# Patient Record
Sex: FEMALE | Race: OTHER | Employment: OTHER | URBAN - METROPOLITAN AREA
[De-identification: names, ages, dates, MRNs, and addresses within clinical notes are randomized per-mention and may not be internally consistent; named-entity substitution may affect disease eponyms.]

---

## 2018-02-05 NOTE — PATIENT DISCUSSION
Discussed condition and exacerbating conditions/situations (e.g., dry/arid environments, overhead fans, air conditioners, side effect of medications). Yes

## 2018-09-06 NOTE — PATIENT DISCUSSION
IOLs with FD.  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  Will need to come out RGPs for some time before IOLs (usually a week per decade of wear).

## 2019-09-12 NOTE — PATIENT DISCUSSION
pt says 15 years ago had a mild stroke in the eye.  ed lets get VF to assess effects on [place] OS so we can best direct his IOL choice and know what actual documentable effects are present.

## 2019-09-12 NOTE — PATIENT DISCUSSION
Cataract symptoms i.e., glare, blur discussed. Pt to call if worsening vision or trouble with driving, TV, reading, ADL. UV precautions. Reviewed possibility of future cataract surgery. home

## 2019-11-06 NOTE — PATIENT DISCUSSION
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  CAUTION with IOL choice OS per KMS.

## 2020-02-10 NOTE — PATIENT DISCUSSION
Patient understands he will still have a blind spot after cataract surgery. Diamox with surgery. No Lensar OS. No advanced lenses.

## 2020-02-10 NOTE — PATIENT DISCUSSION
Patient understands we will not use the Lensar on the left eye since the suction will increase the eye pressure. Patient understands he will still have a blind spot after cataract surgery.

## 2020-02-10 NOTE — PATIENT DISCUSSION
The patient was informed that with [address] [facility] for distance, they will need glasses for all near and intermediate activities after surgery. The patient understands there is a possibility they may need an enhancement after surgery. The patient elects Custom Vision OD (+/- Toric), goal of emmetropia.

## 2020-02-10 NOTE — PATIENT DISCUSSION
Patient has had contact lenses out for 1 month but measurements still show a disparity. Samples of Systance dispensed today. Patient to use QID and come back for repeat measurments in 1 week.

## 2020-02-17 NOTE — PATIENT DISCUSSION
The patient was informed that with [address] [facility] for distance, they will need glasses for all near and intermediate activities after surgery. The patient understands there is a possibility they may need an enhancement after surgery. The patient elects Custom Vision OD (+/- Toric), goal of emmetropia.

## 2020-02-24 NOTE — PATIENT DISCUSSION
The patient was informed that with [address] [facility] for distance, they will need glasses for all near and intermediate activities after surgery. The patient understands there is a possibility they may need an enhancement after surgery. The patient elects Custom Vision OD (+/- Toric), goal of emmetropia.

## 2020-02-25 NOTE — PATIENT DISCUSSION
ok to proceed with IOL OS due to FD dec for sx made today with KMS and Goal: Custom Vision emmetropia.

## 2020-02-25 NOTE — PATIENT DISCUSSION
The patient was informed that with [address] [facility] for distance, they will need glasses for all near and intermediate activities after surgery. The patient understands there is a possibility they may need an enhancement after surgery. The patient elects Custom Vision OD (+/- Toric), goal of emmetropia.

## 2021-08-24 NOTE — PATIENT DISCUSSION
Sending per provider okay.    recent trauma.  NO EOM limitations today but conj is some congested and injected.  try Durezol TID for 5-7 days and FU with dilation.  IF NI will order plain films.   periorbital bones laterally are  to touch and the eye just feels tender to patient definitely sustained soft tissue injury but ecchymosis is gone now.

## 2021-09-02 NOTE — PATIENT DISCUSSION
recent trauma.  NO EOM limitations today but conj is some congested and injected.  try Durezol TID for 5-7 days and FU with dilation.  IF NI will order plain films.   periorbital bones laterally are  to touch and the eye just feels tender to patient definitely sustained soft tissue injury but ecchymosis is gone now.

## 2021-12-22 NOTE — PATIENT DISCUSSION
F/u due The patient was informed that with [address] [facility] for distance, they will need glasses for all near and intermediate activities after surgery. The patient understands there is a possibility they may need an enhancement after surgery. The patient elects Custom Vision OD (+/- Toric), goal of emmetropia.

## 2022-03-24 ENCOUNTER — FOLLOW UP (OUTPATIENT)
Dept: URBAN - METROPOLITAN AREA CLINIC 28 | Facility: CLINIC | Age: 57
End: 2022-03-24

## 2022-03-24 DIAGNOSIS — H43.812: ICD-10-CM

## 2022-03-24 DIAGNOSIS — H04.123: ICD-10-CM

## 2022-03-24 DIAGNOSIS — H35.372: ICD-10-CM

## 2022-03-24 PROCEDURE — 92134 CPTRZ OPH DX IMG PST SGM RTA: CPT

## 2022-03-24 PROCEDURE — 92012 INTRM OPH EXAM EST PATIENT: CPT

## 2022-03-24 ASSESSMENT — VISUAL ACUITY
OD_CC: 20/20
OS_CC: 20/20

## 2022-03-24 ASSESSMENT — TONOMETRY
OS_IOP_MMHG: 16
OD_IOP_MMHG: 16

## 2022-03-24 NOTE — PATIENT DISCUSSION
Advised no jogs or quick sudden movements - avoid jogging, bumpy boat rides, tennis/pickle ball etc.

## 2022-04-13 NOTE — PATIENT DISCUSSION
Discussed the risks/benefits of laser capsulotomy. Observation recommended. Left message for patient to call back

## 2022-04-15 ENCOUNTER — FOLLOW UP (OUTPATIENT)
Dept: URBAN - METROPOLITAN AREA CLINIC 28 | Facility: CLINIC | Age: 57
End: 2022-04-15

## 2022-04-15 DIAGNOSIS — H43.812: ICD-10-CM

## 2022-04-15 PROCEDURE — 99213 OFFICE O/P EST LOW 20 MIN: CPT

## 2022-04-15 ASSESSMENT — TONOMETRY
OS_IOP_MMHG: 14
OD_IOP_MMHG: 13

## 2022-04-15 ASSESSMENT — VISUAL ACUITY
OD_CC: 20/20
OS_CC: 20/20

## 2022-04-15 NOTE — PATIENT DISCUSSION
F/U immediately if notice any symptoms or RT or RD such as a veil over vision, cobweb appearance, new floaters or notices any flashes of light.

## 2022-04-15 NOTE — PATIENT DISCUSSION
Advised no jogs or quick sudden movements - avoid jogging, bumpy boat rides, tennis/pickle ball etc. for another few weeks.

## 2022-07-30 ENCOUNTER — TELEPHONE ENCOUNTER (OUTPATIENT)
Age: 57
End: 2022-07-30

## 2022-07-31 ENCOUNTER — TELEPHONE ENCOUNTER (OUTPATIENT)
Age: 57
End: 2022-07-31

## 2025-01-20 ENCOUNTER — APPOINTMENT (OUTPATIENT)
Dept: URBAN - METROPOLITAN AREA CLINIC 334 | Facility: CLINIC | Age: 60
Setting detail: DERMATOLOGY
End: 2025-01-20

## 2025-01-20 DIAGNOSIS — L57.0 ACTINIC KERATOSIS: ICD-10-CM | Status: INADEQUATELY CONTROLLED

## 2025-01-20 DIAGNOSIS — D18.0 HEMANGIOMA: ICD-10-CM

## 2025-01-20 DIAGNOSIS — Z12.83 ENCOUNTER FOR SCREENING FOR MALIGNANT NEOPLASM OF SKIN: ICD-10-CM

## 2025-01-20 DIAGNOSIS — L81.4 OTHER MELANIN HYPERPIGMENTATION: ICD-10-CM

## 2025-01-20 DIAGNOSIS — L82.1 OTHER SEBORRHEIC KERATOSIS: ICD-10-CM

## 2025-01-20 PROBLEM — D18.01 HEMANGIOMA OF SKIN AND SUBCUTANEOUS TISSUE: Status: ACTIVE | Noted: 2025-01-20

## 2025-01-20 PROCEDURE — ? FULL BODY SKIN EXAM

## 2025-01-20 PROCEDURE — ? TREATMENT REGIMEN

## 2025-01-20 PROCEDURE — 99203 OFFICE O/P NEW LOW 30 MIN: CPT | Mod: 25

## 2025-01-20 PROCEDURE — ? COUNSELING

## 2025-01-20 PROCEDURE — 17000 DESTRUCT PREMALG LESION: CPT

## 2025-01-20 PROCEDURE — ? LIQUID NITROGEN

## 2025-01-20 ASSESSMENT — LOCATION DETAILED DESCRIPTION DERM
LOCATION DETAILED: PERIUMBILICAL SKIN
LOCATION DETAILED: UPPER STERNUM
LOCATION DETAILED: RIGHT INFERIOR MEDIAL UPPER BACK
LOCATION DETAILED: LEFT VENTRAL DISTAL FOREARM

## 2025-01-20 ASSESSMENT — TOTAL NUMBER OF LESIONS: # OF LESIONS?: 1

## 2025-01-20 ASSESSMENT — LOCATION ZONE DERM
LOCATION ZONE: TRUNK
LOCATION ZONE: ARM

## 2025-01-20 ASSESSMENT — LOCATION SIMPLE DESCRIPTION DERM
LOCATION SIMPLE: CHEST
LOCATION SIMPLE: LEFT FOREARM
LOCATION SIMPLE: RIGHT UPPER BACK
LOCATION SIMPLE: ABDOMEN

## 2025-01-20 ASSESSMENT — PAIN INTENSITY VAS: HOW INTENSE IS YOUR PAIN 0 BEING NO PAIN, 10 BEING THE MOST SEVERE PAIN POSSIBLE?: NO PAIN

## 2025-01-20 NOTE — PROCEDURE: LIQUID NITROGEN
Show Aperture Variable?: Yes
Duration Of Freeze Thaw-Cycle (Seconds): 5
Number Of Freeze-Thaw Cycles: 1 freeze-thaw cycle
Detail Level: Detailed
Render Note In Bullet Format When Appropriate: No
Post-Care Instructions: I reviewed with the patient in detail post-care instructions. Patient is to wear sunprotection, and avoid picking at any of the treated lesions. Pt may apply Vaseline to crusted or scabbing areas.
Application Tool (Optional): Liquid Nitrogen Sprayer
Consent: The patient's consent was obtained including but not limited to risks of crusting, scabbing, blistering, scarring, darker or lighter pigmentary change, recurrence, incomplete removal and infection.

## 2025-02-17 ENCOUNTER — COMPREHENSIVE EXAM (OUTPATIENT)
Age: 60
End: 2025-02-17

## 2025-02-17 DIAGNOSIS — H52.223: ICD-10-CM

## 2025-02-17 PROCEDURE — 92014 COMPRE OPH EXAM EST PT 1/>: CPT

## 2025-02-17 PROCEDURE — 92015 DETERMINE REFRACTIVE STATE: CPT
